# Patient Record
Sex: MALE | URBAN - METROPOLITAN AREA
[De-identification: names, ages, dates, MRNs, and addresses within clinical notes are randomized per-mention and may not be internally consistent; named-entity substitution may affect disease eponyms.]

---

## 2024-01-24 ENCOUNTER — OFFICE VISIT (OUTPATIENT)
Dept: URGENT CARE | Age: 34
End: 2024-01-24

## 2024-01-24 VITALS
BODY MASS INDEX: 38.5 KG/M2 | TEMPERATURE: 98.4 F | DIASTOLIC BLOOD PRESSURE: 86 MMHG | OXYGEN SATURATION: 97 % | WEIGHT: 300 LBS | HEIGHT: 74 IN | HEART RATE: 70 BPM | SYSTOLIC BLOOD PRESSURE: 130 MMHG

## 2024-01-24 DIAGNOSIS — R09.89 RHONCHI: ICD-10-CM

## 2024-01-24 DIAGNOSIS — R06.2 WHEEZING: ICD-10-CM

## 2024-01-24 DIAGNOSIS — R09.82 POSTNASAL DRIP: ICD-10-CM

## 2024-01-24 DIAGNOSIS — J06.9 VIRAL URI: Primary | ICD-10-CM

## 2024-01-24 DIAGNOSIS — R05.9 COUGH, UNSPECIFIED TYPE: ICD-10-CM

## 2024-01-24 LAB
INFLUENZA A ANTIBODY: NEGATIVE
INFLUENZA B ANTIBODY: NEGATIVE

## 2024-01-24 RX ORDER — ALBUTEROL SULFATE 90 UG/1
2 AEROSOL, METERED RESPIRATORY (INHALATION) 4 TIMES DAILY PRN
Qty: 18 G | Refills: 0 | Status: SHIPPED | OUTPATIENT
Start: 2024-01-24

## 2024-01-24 RX ORDER — BROMPHENIRAMINE MALEATE, PSEUDOEPHEDRINE HYDROCHLORIDE, AND DEXTROMETHORPHAN HYDROBROMIDE 2; 30; 10 MG/5ML; MG/5ML; MG/5ML
10 SYRUP ORAL 4 TIMES DAILY PRN
Qty: 200 ML | Refills: 1 | Status: SHIPPED | OUTPATIENT
Start: 2024-01-24

## 2024-01-24 RX ORDER — GUAIFENESIN 600 MG/1
600 TABLET, EXTENDED RELEASE ORAL 2 TIMES DAILY
Qty: 20 TABLET | Refills: 0 | Status: SHIPPED | OUTPATIENT
Start: 2024-01-24 | End: 2024-02-03

## 2024-01-24 RX ORDER — BENZONATATE 200 MG/1
200 CAPSULE ORAL 3 TIMES DAILY PRN
Qty: 30 CAPSULE | Refills: 0 | Status: SHIPPED | OUTPATIENT
Start: 2024-01-24 | End: 2024-02-03

## 2024-01-24 NOTE — PROGRESS NOTES
Octavio Maher (: 1990) is a 33 y.o. male, New patient, here for evaluation of the following chief complaint(s):  Cough (Low grade temp that started last Wednesday and now everything has settled into his chest since Monday and now its just lingering. Pt declined covid testing)      ASSESSMENT/PLAN:    ICD-10-CM    1. Viral URI  J06.9 brompheniramine-pseudoephedrine-DM 2-30-10 MG/5ML syrup     guaiFENesin (MUCINEX) 600 MG extended release tablet     benzonatate (TESSALON) 200 MG capsule      2. Cough, unspecified type  R05.9 POCT Influenza A/B     XR CHEST STANDARD (2 VW)     brompheniramine-pseudoephedrine-DM 2-30-10 MG/5ML syrup     guaiFENesin (MUCINEX) 600 MG extended release tablet     benzonatate (TESSALON) 200 MG capsule      3. Rhonchi  R09.89 XR CHEST STANDARD (2 VW)      4. Postnasal drip  R09.82 brompheniramine-pseudoephedrine-DM 2-30-10 MG/5ML syrup     guaiFENesin (MUCINEX) 600 MG extended release tablet      5. Wheezing  R06.2 albuterol sulfate HFA (VENTOLIN HFA) 108 (90 Base) MCG/ACT inhaler          In clinic Flu testing NEGATIVE.  Given cough, SOB, and rhonchi on exam, x-ray obtained to rule out concerns for pneumonia.  Initial x-ray impression: No focal consolidations identified  Radiology impression:  No acute abnormality.    No pneumonia identified on x-ray.    Given breadth of symptoms, exam findings, lack of tonsillar or purulent findings, and with relatively short length of illness, concern for viral etiology over bacterial.  Low concern for bacterial sinusitis, otitis media, Strep pharyngitis, respiratory distress, pneumonia, bronchitis, and PE.  Albuterol inhaler prescribed for relief of the wheezing.  Bromfed prescribed for cough and congestion relief   Guaifenesin prescribed for expectorant therapy  Benzonatate prescribed for breakthrough cough relief.  Recommended OTC medication and home remedy treatments for symptomatic relief    Discussed pt's elevated BP noted during

## 2024-01-24 NOTE — PATIENT INSTRUCTIONS
In clinic Flu testing NEGATIVE.  XR CHEST STANDARD (2 VW)   Final Result   1.  No acute abnormality.           No pneumonia identified on x-ray.  Albuterol inhaler prescribed for relief of the wheezing.  Bromfed prescribed for cough and congestion relief   Do not take other decongestants or cough medications while on this medication.  Guaifenesin prescribed for expectorant therapy  Benzonatate prescribed for breakthrough cough relief.  Recommend OTC treatment for symptoms:  ibuprofen (Advil, Motrin) and acetaminophen (Tylenol) for fevers and pain relief.  decongestants (specifically pseudoephedrine) <avoid if you have a history of high blood pressure or heart conditions>, along with antihistamines (Claritin, Zyrtec, Allegra, or Xyzal) and nasal steroid sprays (Flonase) to help with nasal congestion and runny nose.  throat sprays (Cepacol, chloraseptic) for throat pain.  dextromethorphan (Robitussin, Delsym), throat lozenges, or honey (1-2 teaspoons every hour) for cough.  warm teas, humidifiers, nasal lavages, and sleeping in an inclined position are also helpful options that can lessen symptoms.    Follow up with your PCP or return to the clinic in 5 days if symptoms persist or if symptoms worsen.    New Prescriptions    ALBUTEROL SULFATE HFA (VENTOLIN HFA) 108 (90 BASE) MCG/ACT INHALER    Inhale 2 puffs into the lungs 4 times daily as needed for Wheezing    BENZONATATE (TESSALON) 200 MG CAPSULE    Take 1 capsule by mouth 3 times daily as needed for Cough    BROMPHENIRAMINE-PSEUDOEPHEDRINE-DM 2-30-10 MG/5ML SYRUP    Take 10 mLs by mouth 4 times daily as needed for Congestion or Cough    GUAIFENESIN (MUCINEX) 600 MG EXTENDED RELEASE TABLET    Take 1 tablet by mouth 2 times daily for 10 days

## 2024-01-29 ENCOUNTER — OFFICE VISIT (OUTPATIENT)
Dept: URGENT CARE | Age: 34
End: 2024-01-29

## 2024-01-29 VITALS
HEART RATE: 68 BPM | WEIGHT: 297 LBS | BODY MASS INDEX: 38.13 KG/M2 | SYSTOLIC BLOOD PRESSURE: 129 MMHG | TEMPERATURE: 98.2 F | OXYGEN SATURATION: 95 % | DIASTOLIC BLOOD PRESSURE: 85 MMHG

## 2024-01-29 DIAGNOSIS — J40 BRONCHITIS: Primary | ICD-10-CM

## 2024-01-29 DIAGNOSIS — R06.2 WHEEZING: ICD-10-CM

## 2024-01-29 RX ORDER — METHYLPREDNISOLONE 4 MG/1
TABLET ORAL
Qty: 1 KIT | Refills: 0 | Status: SHIPPED | OUTPATIENT
Start: 2024-01-29

## 2024-01-29 RX ORDER — ALBUTEROL SULFATE 2.5 MG/3ML
2.5 SOLUTION RESPIRATORY (INHALATION) ONCE
Status: COMPLETED | OUTPATIENT
Start: 2024-01-29 | End: 2024-01-29

## 2024-01-29 RX ADMIN — ALBUTEROL SULFATE 2.5 MG: 2.5 SOLUTION RESPIRATORY (INHALATION) at 08:37

## 2024-01-29 NOTE — PATIENT INSTRUCTIONS
Medrol dose serge prescribed to help relieve pulmonary inflammation  Take as directed by the dose pack  Continue using the previously prescribed Albuterol for relief of the wheezing.  Capmist prescribed for cough and congestion relief with expectorant therapy  Do not take other decongestants or cough medications while on this medication.  Recommend OTC treatment for symptoms:  ibuprofen (Advil, Motrin) and acetaminophen (Tylenol) for fevers and pain relief.  decongestants (specifically pseudoephedrine) <avoid if you have a history of high blood pressure or heart conditions>, along with antihistamines (Claritin, Zyrtec, Allegra, or Xyzal) and nasal steroid sprays (Flonase) to help with nasal congestion and runny nose.  throat sprays (Cepacol, chloraseptic) for throat pain.  dextromethorphan (Robitussin, Delsym), throat lozenges, or honey (1-2 teaspoons every hour) for cough.  warm teas, humidifiers, nasal lavages, and sleeping in an inclined position are also helpful options that can lessen symptoms.    Follow up with your PCP or return to the clinic in 5 days if symptoms persist or if symptoms worsen.    New Prescriptions    METHYLPREDNISOLONE (MEDROL DOSEPACK) 4 MG TABLET    Take by mouth.    PSEUDOEPHEDRINE-DM-GG 60- MG TABS    Take 1 tablet by mouth 4 times daily as needed (cough and congestion)

## 2024-01-29 NOTE — PROGRESS NOTES
instructions.    SUBJECTIVE/OBJECTIVE:  HPI:   33 y.o. male presents for complaint of persistent cough and SOB symptoms.   Pt was seen 5 days ago for similar symptoms which he states are not showing signs of resolution at this time.     States the cough feels like it is getting worse, and notes persistence of wheezing and still feels like crackles in his lungs.    Also admits symptoms of SOB - same as initial visit, and notes rib pain with coughing.  Denies symptoms of fevers, chills, sweats, n/v/d, chest pain.     Notes use of the inhaler helps for very short periods, and also notes it helped provide a short duration of relief of the chest congestion.  Still notes exertion makes symptoms worse.    Has taken only what has been prescribed for symptoms.      History provided by:  Patient   used: No        VITAL SIGNS  Vitals:    01/29/24 0814 01/29/24 0858   BP: 129/85    Site: Right Upper Arm    Position: Sitting    Cuff Size: Large Adult    Pulse: 68 68   Temp: 98.2 °F (36.8 °C)    SpO2: 92% 95%   Weight: 134.7 kg (297 lb)        Review of Systems  See HPI for pertinent positives and negatives.    Physical Exam  Vitals reviewed.   Constitutional:       Appearance: Normal appearance.   HENT:      Head: Normocephalic and atraumatic.      Right Ear: Ear canal and external ear normal. No middle ear effusion. Tympanic membrane is retracted. Tympanic membrane is not injected or erythematous.      Left Ear: Ear canal and external ear normal.  No middle ear effusion. Tympanic membrane is retracted. Tympanic membrane is not injected or erythematous.      Nose: Congestion and rhinorrhea present. Rhinorrhea is clear.      Mouth/Throat:      Mouth: Mucous membranes are moist.      Pharynx: Uvula midline. Posterior oropharyngeal erythema present. No pharyngeal swelling, oropharyngeal exudate or uvula swelling.      Comments: Mucus drainage down the posterior oropharynx  Eyes:      Extraocular Movements:

## 2025-03-17 ENCOUNTER — RESULTS FOLLOW-UP (OUTPATIENT)
Dept: URGENT CARE | Age: 35
End: 2025-03-17

## 2025-03-17 ENCOUNTER — OFFICE VISIT (OUTPATIENT)
Dept: URGENT CARE | Age: 35
End: 2025-03-17

## 2025-03-17 VITALS
OXYGEN SATURATION: 97 % | DIASTOLIC BLOOD PRESSURE: 94 MMHG | BODY MASS INDEX: 39.8 KG/M2 | WEIGHT: 310 LBS | TEMPERATURE: 98 F | SYSTOLIC BLOOD PRESSURE: 152 MMHG | HEART RATE: 57 BPM

## 2025-03-17 DIAGNOSIS — R03.0 ELEVATED BLOOD PRESSURE READING WITHOUT DIAGNOSIS OF HYPERTENSION: ICD-10-CM

## 2025-03-17 DIAGNOSIS — M72.2 PLANTAR FASCIITIS: Primary | ICD-10-CM

## 2025-03-17 RX ORDER — MELOXICAM 15 MG/1
15 TABLET ORAL DAILY
Qty: 30 TABLET | Refills: 0 | Status: SHIPPED | OUTPATIENT
Start: 2025-03-17 | End: 2025-04-16

## 2025-03-17 NOTE — PROGRESS NOTES
Octavio Maher (: 1990) is a 34 y.o. male, here for evaluation of the following chief complaint(s): Foot Pain (Right heel since January. Yesterday he couldn't walk on it. No known injury. )    Octavio Maher is a: Established patient.   Last Urgent Care Visit: 2024   I have reviewed the patient's medications and basic medical history; see Medication Reconciliation.    ASSESSMENT/PLAN:    ICD-10-CM    1. Plantar fasciitis  M72.2 Ambulatory referral to Orthopedic Surgery     XR FOOT RIGHT (MIN 3 VIEWS)          Given the patient presentation/history, symptoms of right heel pain - XR ordered to rule out fractures and/or dislocations  XR negative for fracture/dislocation  Suspect this is Plantar Fasciitis  No evidence of acute fracture, compartment syndrome, neurovascular compromise or procedure requiring immediate surgical intervention   RICE, tylenol/ibuprofen per box instructions pending no contraindications  Begin night splint, Mobic, and heel inserts  Advised to follow up with Ortho  We discussed that other occult fractures, ligament injury, tendon injury, cartilage injury, and joint injury have been considered and cannot be completely excluded.  It is understood that if the patient is not improving as expected or if other new symptoms or signs of concern develop, other etiologies or diagnoses may need to be considered requiring other tests, treatments, and/or consultations. The diagnosis, plan, expected course, follow-up, and return precautions were discussed and all questions were answered  Discussed PCP follow up for persisting or worsening symptoms, or to return to the clinic if unable to obtain PCP follow up for worsening symptoms  The patient and/or the family were informed of the results of any tests, a time was given to answer questions, a plan was proposed and they agreed with plan. Reviewed AVS with treatment instructions and answered questions - pt/family expresses

## 2025-03-17 NOTE — PATIENT INSTRUCTIONS
Get a night splint as discussed  Apply ice to the affected area per instructions below  Keep elevated as often as possible  Begin the medication prescribed  Follow up with orthopedics ([facility] / [phone])     ICE INSTRUCTIONS  ICE CUBES OR CRUSHED ICE IN A ZIPLOCK FREEZER BAG  APPLY TO AFFECTED AREA 20 MIN ON AND 40 MIN OFF AS OFTEN AS POSSIBLE FOR THE NEXT 3-5 DAYS  APPLY DIRECTLY TO SKIN.  IF TOO PAINFUL PLACE WET CLOTH IN BETWEEN ICE AND SKIN  IF STILL PAINFUL AND SWOLLEN TRY A CONTRAST BATH  Fill one container with warm water and another with cold water.   Immerse the affected area in warm water for 1-3 minutes.   Immediately immerse the affected area in cold water for 1 minute.   Repeat the process for about 20 minutes, ending with cold water.

## 2025-04-28 ENCOUNTER — OFFICE VISIT (OUTPATIENT)
Dept: ORTHOPEDIC SURGERY | Age: 35
End: 2025-04-28

## 2025-04-28 VITALS — HEIGHT: 74 IN | OXYGEN SATURATION: 96 % | BODY MASS INDEX: 38.24 KG/M2 | WEIGHT: 298 LBS | HEART RATE: 77 BPM

## 2025-04-28 DIAGNOSIS — M25.371 INSTABILITY OF RIGHT ANKLE JOINT: ICD-10-CM

## 2025-04-28 DIAGNOSIS — M72.2 PLANTAR FASCIITIS: ICD-10-CM

## 2025-04-28 DIAGNOSIS — M79.671 BILATERAL FOOT PAIN: Primary | ICD-10-CM

## 2025-04-28 DIAGNOSIS — M62.462 GASTROCNEMIUS EQUINUS OF BOTH LOWER EXTREMITIES: ICD-10-CM

## 2025-04-28 DIAGNOSIS — M79.672 BILATERAL FOOT PAIN: Primary | ICD-10-CM

## 2025-04-28 DIAGNOSIS — M62.461 GASTROCNEMIUS EQUINUS OF BOTH LOWER EXTREMITIES: ICD-10-CM

## 2025-04-28 NOTE — PROGRESS NOTES
FOOT LEFT (MIN 3 VIEWS)     WEIGHTBEARING 3 views: AP, Oblique, Lateral     Standing Status:   Future     Number of Occurrences:   1     Expected Date:   4/28/2025     Expiration Date:   5/28/2025     Reason for exam::   eval alignment    XR FOOT RIGHT (MIN 3 VIEWS)     WEIGHTBEARING 3 views: AP, Oblique, Lateral     Standing Status:   Future     Number of Occurrences:   1     Expected Date:   4/28/2025     Expiration Date:   5/28/2025     Reason for exam::   eval alignment    Ambulatory referral to Physical Therapy     Referral Priority:   Routine     Referral Type:   Eval and Treat     Referral Reason:   Specialty Services Required     Requested Specialty:   Physical Therapy     Number of Visits Requested:   1         Brandon Wong MD  Orthopedic Surgery        Please excuse any typos/errors, as this note was created with the assistance of voice recognition software. While intending to generate a document that actually reflects the content of the visit, the document can still have some errors including those of syntax and sound-a-like substitutions which may escape proof reading. In such instances, actual meaning can be extrapolated by context.

## 2025-05-02 ASSESSMENT — ACTIVITIES OF DAILY LIVING (ADL): FAAM_ACTIVITIES_OF_DAILY_LIVING_SCORE: 95.24

## 2025-05-05 ENCOUNTER — HOSPITAL ENCOUNTER (OUTPATIENT)
Dept: PHYSICAL THERAPY | Age: 35
Setting detail: THERAPIES SERIES
Discharge: HOME | End: 2025-05-05
Payer: COMMERCIAL

## 2025-05-05 DIAGNOSIS — M79.671 RIGHT FOOT PAIN: Primary | ICD-10-CM

## 2025-05-05 PROCEDURE — 97112 NEUROMUSCULAR REEDUCATION: CPT

## 2025-05-05 PROCEDURE — 97110 THERAPEUTIC EXERCISES: CPT

## 2025-05-05 PROCEDURE — 97161 PT EVAL LOW COMPLEX 20 MIN: CPT

## 2025-05-05 NOTE — PLAN OF CARE
[facility] [address], [place] [address] office: [phone] fax: [phone]     Physical Therapy Initial Evaluation Certification      Dear Juanito Wong MD,    We had the pleasure of evaluating the following patient for physical therapy services at [facility].  A summary of our findings can be found in the initial assessment below.  This includes our plan of care.  If you have any questions or concerns regarding these findings, please do not hesitate to contact me at the office phone number listed above.  Thank you for the referral.     Physician Signature:_______________________________Date:__________________  By signing above (or electronic signature), therapist’s plan is approved by physician       Physical Therapy: TREATMENT/PROGRESS NOTE   Patient: Octavio Maher (35 y.o. male)   Examination Date: 2025   :  1990 MRN: [id number]   Visit #: 1   Insurance Allowable Auth Needed   120 []Yes    []No    Insurance: Payor: [employer] / Plan: [employer] OPEN ACCESS PLUS (OAP) / Product Type: *No Product type* /   Insurance ID: [id number] - (Commercial)  Secondary Insurance (if applicable):    Treatment Diagnosis:     ICD-10-CM    1. Right foot pain  M79.671          Medical Diagnosis:  Bilateral foot pain [M79.671, M79.672]  Plantar fasciitis [M72.2]  Gastrocnemius equinus of both lower extremities [M62.461, M62.462]  Instability of right ankle joint [M25.371]   Referring Physician: Juanito Wong MD  PCP: No primary care provider on file.       Plan of care signed (Y/N):     Date of Patient follow up with Physician:      Progress Report Due: 25  POC update due: 25                                            Precautions/ Contra-indications:           Latex allergy:  NO  Pacemaker:    NO  Contraindications for Manipulation: None  Date of Surgery: Na  Other: NA    Red